# Patient Record
Sex: FEMALE | Race: WHITE | URBAN - METROPOLITAN AREA
[De-identification: names, ages, dates, MRNs, and addresses within clinical notes are randomized per-mention and may not be internally consistent; named-entity substitution may affect disease eponyms.]

---

## 2023-03-21 ENCOUNTER — NURSING HOME VISIT (OUTPATIENT)
Dept: POST ACUTE CARE | Facility: EXTERNAL LOCATION | Age: 88
End: 2023-03-21
Payer: MEDICARE

## 2023-03-21 DIAGNOSIS — F41.9 ANXIETY: ICD-10-CM

## 2023-03-21 DIAGNOSIS — I10 HYPERTENSION, UNSPECIFIED TYPE: ICD-10-CM

## 2023-03-21 DIAGNOSIS — K21.9 GASTROESOPHAGEAL REFLUX DISEASE, UNSPECIFIED WHETHER ESOPHAGITIS PRESENT: Primary | ICD-10-CM

## 2023-03-21 DIAGNOSIS — R00.0 TACHYCARDIA: ICD-10-CM

## 2023-03-21 PROCEDURE — 99348 HOME/RES VST EST LOW MDM 30: CPT | Performed by: EMERGENCY MEDICINE

## 2023-03-21 NOTE — LETTER
Patient: Layal Loco  : 1931    Encounter Date: 2023    Provider Impression     Assisted living note     Place of service- [facility] assisted living facility in [facility]     GERD- continue PPI  Hypertension- continue amlodipine and doxazosin  Tachycardia- continue metoprolol  Anxiety and depression-continue Lexapro     Provide safe environment for the patient     Continue current medication regimen     OT PT and speech therapy     Bowel and bladder,skin care     Nutritional support      monitor and treat blood glucose     GI and DVT prophylaxis     PRN medications     Periodic lab work     Regular Follow up      Charting was completed using electronic voice recognition technology and may have unintended errors which may not have been completely corrected.        History of Present Illness  Assisted living note     Place of service- [facility] assisted living facility in [facility] [place]        Patient is unable to give any detailed history and therefore history is obtained from the chart     No acute issues reported by patient or concerns raised by nursing     Past history  Anxiety and depression, hypertension, vascular dementia, GERD, coronary artery disease, DJD, CKD 3, cataracts, chronic back pain        Allergies-latex rubber     Social history-, negative for alcohol tobacco or drugs     Family history- noncontributory      Review of Systems  All eleven systems were reviewed with the patient and were negative for any symptoms other than what is documented in the history of present illness.        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 Lungs are clear   Cardiovascular: heart rate normal. No gallop  Back - normal inspection  Skin  inspection:Warm  Musculoskeletal : No deformities  Neuro : Limited exam. Baseline  Psychiatric : Cooperative      Electronically Signed By: Keegan Torrez MD   4/9/23  1:23 PM

## 2023-04-09 NOTE — PROGRESS NOTES
Provider Impression     Assisted living note     Place of service- [facility] assisted living facility in [facility]     GERD- continue PPI  Hypertension- continue amlodipine and doxazosin  Tachycardia- continue metoprolol  Anxiety and depression-continue Lexapro     Provide safe environment for the patient     Continue current medication regimen     OT PT and speech therapy     Bowel and bladder,skin care     Nutritional support      monitor and treat blood glucose     GI and DVT prophylaxis     PRN medications     Periodic lab work     Regular Follow up      Charting was completed using electronic voice recognition technology and may have unintended errors which may not have been completely corrected.        History of Present Illness  Assisted living note     Place of service- [facility] assisted living facility in [facility]        Patient is unable to give any detailed history and therefore history is obtained from the chart     No acute issues reported by patient or concerns raised by nursing     Past history  Anxiety and depression, hypertension, vascular dementia, GERD, coronary artery disease, DJD, CKD 3, cataracts, chronic back pain        Allergies-latex rubber     Social history-, negative for alcohol tobacco or drugs     Family history- noncontributory      Review of Systems  All eleven systems were reviewed with the patient and were negative for any symptoms other than what is documented in the history of present illness.        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 Lungs are clear   Cardiovascular: heart rate normal. No gallop  Back - normal inspection  Skin inspection:Warm  Musculoskeletal : No deformities  Neuro : Limited exam.  Baseline  Psychiatric : Cooperative

## 2023-04-18 ENCOUNTER — NURSING HOME VISIT (OUTPATIENT)
Dept: POST ACUTE CARE | Facility: EXTERNAL LOCATION | Age: 88
End: 2023-04-18
Payer: MEDICARE

## 2023-04-18 DIAGNOSIS — N18.30 STAGE 3 CHRONIC KIDNEY DISEASE, UNSPECIFIED WHETHER STAGE 3A OR 3B CKD (MULTI): ICD-10-CM

## 2023-04-18 DIAGNOSIS — F32.4 MAJOR DEPRESSIVE DISORDER IN PARTIAL REMISSION, UNSPECIFIED WHETHER RECURRENT (CMS-HCC): ICD-10-CM

## 2023-04-18 DIAGNOSIS — I11.9 CARDIOMYOPATHY, HYPERTENSIVE, BENIGN, WITHOUT HEART FAILURE (MULTI): ICD-10-CM

## 2023-04-18 DIAGNOSIS — F03.B0 MODERATE DEMENTIA, UNSPECIFIED DEMENTIA TYPE, UNSPECIFIED WHETHER BEHAVIORAL, PSYCHOTIC, OR MOOD DISTURBANCE OR ANXIETY (MULTI): Primary | ICD-10-CM

## 2023-04-18 DIAGNOSIS — I25.118 CORONARY ARTERY DISEASE INVOLVING NATIVE HEART WITH OTHER FORM OF ANGINA PECTORIS, UNSPECIFIED VESSEL OR LESION TYPE (CMS-HCC): ICD-10-CM

## 2023-04-18 DIAGNOSIS — I43 CARDIOMYOPATHY, HYPERTENSIVE, BENIGN, WITHOUT HEART FAILURE (MULTI): ICD-10-CM

## 2023-04-18 PROCEDURE — 99349 HOME/RES VST EST MOD MDM 40: CPT | Performed by: EMERGENCY MEDICINE

## 2023-04-18 NOTE — LETTER
Patient: Layla Loco  : 1931    Encounter Date: 2023    Provider Impression     Assisted living note     Place of service- [facility] assisted living facility in [facility]     GERD- continue PPI  Hypertension- continue amlodipine and doxazosin  Tachycardia- continue metoprolol  Anxiety and depression-continue Lexapro     -Fall prevention    -Cognitive engagement     -Monitor and treat blood pressure     -Aggressive decubitus ulcer prevention.     -Bowel and bladder care     -Optimal nutrition and supplementation as needed     -GI  and DVT prophylaxis     -Symptom control     -Ambulation as tolerated     -Will follow       Charting was completed using electronic voice recognition technology and may have unintended errors which may not have been completely corrected.        History of Present Illness  Assisted living note     Place of service- [facility] assisted living facility in [facility]        Patient is unable to give any detailed history and therefore history is obtained from the chart     No acute issues reported by patient or concerns raised by nursing     Past history  Anxiety and depression, hypertension, vascular dementia, GERD, coronary artery disease, DJD, CKD 3, cataracts, chronic back pain        Allergies-latex rubber     Social history-, negative for alcohol tobacco or drugs     Family history- noncontributory      Review of Systems  All eleven systems were reviewed with the patient and were negative for any symptoms other than what is documented in the history of present illness.        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 Lungs are clear   Cardiovascular: heart rate normal. No gallop  Back - normal inspection  Skin  inspection:Warm  Musculoskeletal : No deformities  Neuro : Limited exam. Baseline  Psychiatric : Cooperative      Electronically Signed By: Keegan Torrez MD   4/23/23  7:41 AM

## 2023-04-23 PROBLEM — I43 CARDIOMYOPATHY, HYPERTENSIVE, BENIGN, WITHOUT HEART FAILURE (MULTI): Status: ACTIVE | Noted: 2023-04-23

## 2023-04-23 PROBLEM — I25.10 CORONARY ARTERY DISEASE INVOLVING NATIVE HEART: Status: ACTIVE | Noted: 2023-04-23

## 2023-04-23 PROBLEM — I11.9 CARDIOMYOPATHY, HYPERTENSIVE, BENIGN, WITHOUT HEART FAILURE (MULTI): Status: ACTIVE | Noted: 2023-04-23

## 2023-04-23 PROBLEM — F03.B0 MODERATE DEMENTIA (MULTI): Status: ACTIVE | Noted: 2023-04-23

## 2023-04-23 PROBLEM — F32.4 MAJOR DEPRESSIVE DISORDER IN PARTIAL REMISSION (CMS-HCC): Status: ACTIVE | Noted: 2023-04-23

## 2023-04-23 PROBLEM — N18.30 STAGE 3 CHRONIC KIDNEY DISEASE (MULTI): Status: ACTIVE | Noted: 2023-04-23

## 2023-04-23 NOTE — PROGRESS NOTES
Provider Impression     Assisted living note     Place of service- [facility] assisted living facility in [facility]     GERD- continue PPI  Hypertension- continue amlodipine and doxazosin  Tachycardia- continue metoprolol  Anxiety and depression-continue Lexapro     -Fall prevention    -Cognitive engagement     -Monitor and treat blood pressure     -Aggressive decubitus ulcer prevention.     -Bowel and bladder care     -Optimal nutrition and supplementation as needed     -GI  and DVT prophylaxis     -Symptom control     -Ambulation as tolerated     -Will follow       Charting was completed using electronic voice recognition technology and may have unintended errors which may not have been completely corrected.        History of Present Illness  Assisted living note     Place of service- [facility] living [facility] in [facility]        Patient is unable to give any detailed history and therefore history is obtained from the chart     No acute issues reported by patient or concerns raised by nursing     Past history  Anxiety and depression, hypertension, vascular dementia, GERD, coronary artery disease, DJD, CKD 3, cataracts, chronic back pain        Allergies-latex rubber     Social history-, negative for alcohol tobacco or drugs     Family history- noncontributory      Review of Systems  All eleven systems were reviewed with the patient and were negative for any symptoms other than what is documented in the history of present illness.        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 Lungs are clear   Cardiovascular: heart rate normal. No gallop  Back - normal inspection  Skin inspection:Warm  Musculoskeletal : No deformities  Neuro : Limited exam.  Baseline  Psychiatric : Cooperative

## 2023-05-16 ENCOUNTER — HOME VISIT (OUTPATIENT)
Dept: POST ACUTE CARE | Facility: EXTERNAL LOCATION | Age: 88
End: 2023-05-16
Payer: MEDICARE

## 2023-05-16 DIAGNOSIS — N18.30 STAGE 3 CHRONIC KIDNEY DISEASE, UNSPECIFIED WHETHER STAGE 3A OR 3B CKD (MULTI): ICD-10-CM

## 2023-05-16 DIAGNOSIS — F03.B0 MODERATE DEMENTIA, UNSPECIFIED DEMENTIA TYPE, UNSPECIFIED WHETHER BEHAVIORAL, PSYCHOTIC, OR MOOD DISTURBANCE OR ANXIETY (MULTI): ICD-10-CM

## 2023-05-16 DIAGNOSIS — I25.118 CORONARY ARTERY DISEASE INVOLVING NATIVE HEART WITH OTHER FORM OF ANGINA PECTORIS, UNSPECIFIED VESSEL OR LESION TYPE (CMS-HCC): Primary | ICD-10-CM

## 2023-05-16 DIAGNOSIS — F32.4 MAJOR DEPRESSIVE DISORDER IN PARTIAL REMISSION, UNSPECIFIED WHETHER RECURRENT (CMS-HCC): ICD-10-CM

## 2023-05-16 PROCEDURE — 99349 HOME/RES VST EST MOD MDM 40: CPT | Performed by: EMERGENCY MEDICINE

## 2023-05-16 NOTE — PROGRESS NOTES
Provider Impression     Assisted living note     Place of service- [facility] living [facility] in [facility]     GERD- continue PPI  Hypertension- continue amlodipine and doxazosin  Tachycardia- continue metoprolol  Anxiety and depression-continue Lexapro     1. medications are reviewed      2. Continue with rehabilitative, supportive, and or restorative care as ordered and as the patient tolerates     3. Laboratory evaluations will be monitored on an ongoing as needed basis     4. Medications have been cross-referenced with the patient's diagnoses list, and medications reductions have been considered and/or implemented.     5. Pharmacy recommendations are addressed on an ongoing as needed basis.     6. Controlled substances have been electronically scripted every 60 days for opiates and others of similar schedule, and every 6 months for sedative/hypnotics and others of similar schedule.     7. Nursing has been queried about any potential adverse events that need to be reported to me.    Salient information and adjustment of care plan pertaining to this individual patient interaction today are the following:      A. We will continue with restorative and supportive care as the patient tolerates    B. Laboratory examinations will continue to be drawn on an ongoing as-needed basis. The patient's weight needs to be monitored and if needed we may need to institute appetite stimulating medication    C. The patient's long term prognosis is guarded    Charting is done using voice recognition software and may contain errors which may not have been completely corrected          History of Present Illness  Assisted living note     Place of service- [facility] living [facility] in [facility]        Patient is unable to give any detailed history and therefore history is obtained from the chart     No acute issues reported by patient or concerns raised by nursing     Past history  Anxiety and depression,  hypertension, vascular dementia, GERD, coronary artery disease, DJD, CKD 3, cataracts, chronic back pain        Allergies-latex rubber     Social history-, negative for alcohol tobacco or drugs     Family history- noncontributory      Review of Systems  All eleven systems were reviewed with the patient and were negative for any symptoms other than what is documented in the history of present illness.        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 Lungs are clear   Cardiovascular: heart rate normal. No gallop  Back - normal inspection  Skin inspection:Warm  Musculoskeletal : No deformities  Neuro : Limited exam. Baseline  Psychiatric : Cooperative

## 2023-06-20 ENCOUNTER — HOME VISIT (OUTPATIENT)
Dept: POST ACUTE CARE | Facility: EXTERNAL LOCATION | Age: 88
End: 2023-06-20
Payer: MEDICARE

## 2023-06-20 DIAGNOSIS — I25.118 CORONARY ARTERY DISEASE INVOLVING NATIVE HEART WITH OTHER FORM OF ANGINA PECTORIS, UNSPECIFIED VESSEL OR LESION TYPE (CMS-HCC): ICD-10-CM

## 2023-06-20 DIAGNOSIS — N18.30 STAGE 3 CHRONIC KIDNEY DISEASE, UNSPECIFIED WHETHER STAGE 3A OR 3B CKD (MULTI): ICD-10-CM

## 2023-06-20 DIAGNOSIS — F32.4 MAJOR DEPRESSIVE DISORDER IN PARTIAL REMISSION, UNSPECIFIED WHETHER RECURRENT (CMS-HCC): Primary | ICD-10-CM

## 2023-06-20 DIAGNOSIS — F03.B0 MODERATE DEMENTIA, UNSPECIFIED DEMENTIA TYPE, UNSPECIFIED WHETHER BEHAVIORAL, PSYCHOTIC, OR MOOD DISTURBANCE OR ANXIETY (MULTI): ICD-10-CM

## 2023-06-20 PROCEDURE — 99348 HOME/RES VST EST LOW MDM 30: CPT | Performed by: EMERGENCY MEDICINE

## 2023-07-01 NOTE — PROGRESS NOTES
Provider Impression     Assisted living note     Place of service- [facility] assisted living facility in [facility]     GERD- continue PPI  Hypertension- continue amlodipine and doxazosin  Tachycardia- continue metoprolol  Anxiety and depression-continue Lexapro     -Fall prevention    -Cognitive engagement     -Monitor and treat blood pressure     -Aggressive decubitus ulcer prevention.     -Bowel and bladder care     -Optimal nutrition and supplementation as needed     -GI  and DVT prophylaxis     -Symptom control     -Ambulation as tolerated     -Will follow    Charting is done using voice recognition software and may contain errors which have not been completely corrected            History of Present Illness  Assisted living note     Place of service- [facility] assisted living [facility] in [facility]        Patient is unable to give any detailed history and therefore history is obtained from the chart     No acute issues reported by patient or concerns raised by nursing     Past history  Anxiety and depression, hypertension, vascular dementia, GERD, coronary artery disease, DJD, CKD 3, cataracts, chronic back pain        Allergies-latex rubber     Social history-, negative for alcohol tobacco or drugs     Family history- noncontributory      Review of Systems  All eleven systems were reviewed with the patient and were negative for any symptoms other than what is documented in the history of present illness.        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 Lungs are clear   Cardiovascular: heart rate normal. No gallop  Back - normal inspection  Skin inspection:Warm  Musculoskeletal : No deformities  Neuro : Limited exam. Baseline  Psychiatric :  Cooperative

## 2023-07-18 ENCOUNTER — HOME VISIT (OUTPATIENT)
Dept: POST ACUTE CARE | Facility: EXTERNAL LOCATION | Age: 88
End: 2023-07-18
Payer: MEDICARE

## 2023-07-18 DIAGNOSIS — I43 CARDIOMYOPATHY, HYPERTENSIVE, BENIGN, WITHOUT HEART FAILURE (MULTI): ICD-10-CM

## 2023-07-18 DIAGNOSIS — F32.4 MAJOR DEPRESSIVE DISORDER IN PARTIAL REMISSION, UNSPECIFIED WHETHER RECURRENT (CMS-HCC): ICD-10-CM

## 2023-07-18 DIAGNOSIS — I11.9 CARDIOMYOPATHY, HYPERTENSIVE, BENIGN, WITHOUT HEART FAILURE (MULTI): ICD-10-CM

## 2023-07-18 DIAGNOSIS — N18.30 STAGE 3 CHRONIC KIDNEY DISEASE, UNSPECIFIED WHETHER STAGE 3A OR 3B CKD (MULTI): ICD-10-CM

## 2023-07-18 DIAGNOSIS — I25.118 CORONARY ARTERY DISEASE INVOLVING NATIVE HEART WITH OTHER FORM OF ANGINA PECTORIS, UNSPECIFIED VESSEL OR LESION TYPE (CMS-HCC): ICD-10-CM

## 2023-07-18 DIAGNOSIS — F03.B0 MODERATE DEMENTIA, UNSPECIFIED DEMENTIA TYPE, UNSPECIFIED WHETHER BEHAVIORAL, PSYCHOTIC, OR MOOD DISTURBANCE OR ANXIETY (MULTI): ICD-10-CM

## 2023-07-18 DIAGNOSIS — Z00.00 ENCOUNTER FOR MEDICARE ANNUAL WELLNESS EXAM: Primary | ICD-10-CM

## 2023-07-18 PROCEDURE — 99348 HOME/RES VST EST LOW MDM 30: CPT | Performed by: EMERGENCY MEDICINE

## 2023-07-18 PROCEDURE — G0439 PPPS, SUBSEQ VISIT: HCPCS | Performed by: EMERGENCY MEDICINE

## 2023-07-18 PROCEDURE — 99497 ADVNCD CARE PLAN 30 MIN: CPT | Performed by: EMERGENCY MEDICINE

## 2023-08-02 ASSESSMENT — ACTIVITIES OF DAILY LIVING (ADL)
DRESSING: DEPENDENT
MANAGING_FINANCES: TOTAL CARE
DOING_HOUSEWORK: TOTAL CARE
TAKING_MEDICATION: TOTAL CARE
BATHING: DEPENDENT
GROCERY_SHOPPING: TOTAL CARE

## 2023-08-02 ASSESSMENT — PATIENT HEALTH QUESTIONNAIRE - PHQ9
2. FEELING DOWN, DEPRESSED OR HOPELESS: SEVERAL DAYS
10. IF YOU CHECKED OFF ANY PROBLEMS, HOW DIFFICULT HAVE THESE PROBLEMS MADE IT FOR YOU TO DO YOUR WORK, TAKE CARE OF THINGS AT HOME, OR GET ALONG WITH OTHER PEOPLE: VERY DIFFICULT
1. LITTLE INTEREST OR PLEASURE IN DOING THINGS: SEVERAL DAYS
SUM OF ALL RESPONSES TO PHQ9 QUESTIONS 1 AND 2: 2

## 2023-08-02 NOTE — PROGRESS NOTES
Provider Impression     MEDICARE [facility]LLNESS      Assisted living note     Place of service- [facility] assisted living facility in [facility]     GERD- continue PPI  Hypertension- continue amlodipine and doxazosin  Tachycardia- continue metoprolol  Anxiety and depression-continue Lexapro     Provide safe environment for the patient     Continue current medication regimen     OT PT and speech therapy     Bowel and bladder,skin care     Nutritional support     monitor and treat blood glucose     GI  and DVT prophylaxis     PRN medications     Periodic lab work    Regular Follow up    PH Q-9 depression screening was completed by authorized employee of the practice and answer of the questionnaire were explained and discussed with the patient.    Face-to-face with discussion completed with this individual regarding their cardiovascular risk and behavioral therapies of nutritional choices, exercise and elimination of habits contradicting to the risk. We agreed on how they may be able to reduce their current cardiovascular risk.     Screening for alcohol use completed.     I discussed advanced care planning including the explanation and discussion of advanced directives. If patient does not have current up to date documents, examples and information provided on how to create both living will and power of . Patient was encouraged to work on completing these documents.  Information and advise was also provided on DO NOT RESUSCITATE and patient encouraged to consider this  Patient code status is filed with facility.     Charting is done using voice recognition software and may contain errors which have not been completely corrected         History of Present Illness  Assisted living note    [facility]- [facility] assisted living [facility] in [facility]        Patient is unable to give any detailed history and therefore history is obtained from the chart     No acute issues reported  by patient or concerns raised by nursing     Past history  Anxiety and depression, hypertension, vascular dementia, GERD, coronary artery disease, DJD, CKD 3, cataracts, chronic back pain        Allergies-latex rubber     Social history-, negative for alcohol tobacco or drugs     Family history- noncontributory      Review of Systems  All eleven systems were reviewed with the patient and were negative for any symptoms other than what is documented in the history of present illness.        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 Lungs are clear   Cardiovascular: heart rate normal. No gallop  Back - normal inspection  Skin inspection:Warm  Musculoskeletal : No deformities  Neuro : Limited exam. Baseline  Psychiatric : Cooperative

## 2023-08-17 ENCOUNTER — HOME VISIT (OUTPATIENT)
Dept: POST ACUTE CARE | Facility: EXTERNAL LOCATION | Age: 88
End: 2023-08-17
Payer: MEDICARE

## 2023-08-17 DIAGNOSIS — F03.B0 MODERATE DEMENTIA, UNSPECIFIED DEMENTIA TYPE, UNSPECIFIED WHETHER BEHAVIORAL, PSYCHOTIC, OR MOOD DISTURBANCE OR ANXIETY (MULTI): ICD-10-CM

## 2023-08-17 DIAGNOSIS — I43 CARDIOMYOPATHY, HYPERTENSIVE, BENIGN, WITHOUT HEART FAILURE (MULTI): ICD-10-CM

## 2023-08-17 DIAGNOSIS — I11.9 CARDIOMYOPATHY, HYPERTENSIVE, BENIGN, WITHOUT HEART FAILURE (MULTI): ICD-10-CM

## 2023-08-17 DIAGNOSIS — N18.30 STAGE 3 CHRONIC KIDNEY DISEASE, UNSPECIFIED WHETHER STAGE 3A OR 3B CKD (MULTI): Primary | ICD-10-CM

## 2023-08-17 DIAGNOSIS — F32.4 MAJOR DEPRESSIVE DISORDER IN PARTIAL REMISSION, UNSPECIFIED WHETHER RECURRENT (CMS-HCC): ICD-10-CM

## 2023-08-17 PROCEDURE — 99348 HOME/RES VST EST LOW MDM 30: CPT | Performed by: EMERGENCY MEDICINE

## 2023-08-26 NOTE — PROGRESS NOTES
Provider Impression     Assisted living note     Place of service- [facility] assisted living facility in [facility]     GERD- continue PPI  Hypertension- continue amlodipine and doxazosin  Tachycardia- continue metoprolol  Anxiety and depression-continue Lexapro     Provide safe environment for the patient     Continue current medication regimen     OT PT and speech therapy     Bowel and bladder,skin care     Nutritional support     monitor and treat blood glucose     GI  and DVT prophylaxis     PRN medications     Periodic lab work    Regular Follow up    Charting is done using voice recognition software and may contain errors which have not been completely corrected              History of Present Illness  Assisted living note     Place of service- [facility] assisted living facility in [facility]        Patient is unable to give any detailed history and therefore history is obtained from the chart     No acute issues reported by patient or concerns raised by nursing     Past history  Anxiety and depression, hypertension, vascular dementia, GERD, coronary artery disease, DJD, CKD 3, cataracts, chronic back pain        Allergies-latex rubber     Social history-, negative for alcohol tobacco or drugs     Family history- noncontributory      Review of Systems  All eleven systems were reviewed with the patient and were negative for any symptoms other than what is documented in the history of present illness.        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 Lungs are clear   Cardiovascular: heart rate normal. No gallop  Back - normal inspection  Skin inspection:Warm  Musculoskeletal : No deformities  Neuro : Limited exam. Baseline  Psychiatric : Cooperative

## 2023-09-26 ENCOUNTER — NURSING HOME VISIT (OUTPATIENT)
Dept: POST ACUTE CARE | Facility: EXTERNAL LOCATION | Age: 88
End: 2023-09-26
Payer: MEDICARE

## 2023-09-26 DIAGNOSIS — I11.9 CARDIOMYOPATHY, HYPERTENSIVE, BENIGN, WITHOUT HEART FAILURE (MULTI): ICD-10-CM

## 2023-09-26 DIAGNOSIS — F32.4 MAJOR DEPRESSIVE DISORDER IN PARTIAL REMISSION, UNSPECIFIED WHETHER RECURRENT (CMS-HCC): ICD-10-CM

## 2023-09-26 DIAGNOSIS — N18.30 STAGE 3 CHRONIC KIDNEY DISEASE, UNSPECIFIED WHETHER STAGE 3A OR 3B CKD (MULTI): ICD-10-CM

## 2023-09-26 DIAGNOSIS — I43 CARDIOMYOPATHY, HYPERTENSIVE, BENIGN, WITHOUT HEART FAILURE (MULTI): ICD-10-CM

## 2023-09-26 PROCEDURE — 99349 HOME/RES VST EST MOD MDM 40: CPT | Performed by: EMERGENCY MEDICINE

## 2023-09-26 NOTE — LETTER
Patient: Layla Loco  : 1931    Encounter Date: 2023    Provider Impression     Assisted living note     Place of service- [facility] assisted living facility in [facility] [place]     GERD- continue PPI  Hypertension- continue amlodipine and doxazosin  Tachycardia- continue metoprolol  Anxiety and depression-continue Lexapro     Rx list reviewed.   PT and OT evaluation is in the process.   Routine safety measures, fall precautions, risk modification and alarm placement if needed for prevention of falls.   Skin care precautions, prevention of pressures sores at pressure points assessed.   Pt needs to be monitored frequently by nursing staff particularly at night time.   Any confusion, agitation or behavioural disturbance needs to be attended, as per home policy   rapid covid Ag assay need to be done, notify if positive.   If needed appropriate measures to be taken for alarm placements and assisted devices, pt was told not to get up and ambulate at night unless help and assist available at bedside,   labs will be done as per our routine protocol.   PO intake need to be monitored if consuming po.       Charting is done using voice recognition software and may contain errors which have not been completely corrected      History of Present Illness  Assisted living note     Place of service- [facility] assisted living facility in [facility] [place]        Patient is unable to give any detailed history and therefore history is obtained from the chart     No acute issues reported by patient or concerns raised by nursing     Past history  Anxiety and depression, hypertension, vascular dementia, GERD, coronary artery disease, DJD, CKD 3, cataracts, chronic back pain        Allergies-latex rubber     Social history-, negative for alcohol tobacco or drugs     Family history- noncontributory      Review of Systems  All eleven systems were reviewed with the patient and were negative for any symptoms other than  what is documented in the history of present illness.        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 Lungs are clear   Cardiovascular: heart rate normal. No gallop  Back - normal inspection  Skin inspection:Warm  Musculoskeletal : No deformities  Neuro : Limited exam. Baseline  Psychiatric : Cooperative      Electronically Signed By: Keegan Torrez MD   10/10/23  1:32 PM

## 2023-09-29 NOTE — PROGRESS NOTES
Provider Impression     Assisted living note     Place of service- [facility] assisted living facility in [facility]     GERD- continue PPI  Hypertension- continue amlodipine and doxazosin  Tachycardia- continue metoprolol  Anxiety and depression-continue Lexapro     Rx list reviewed.   PT and OT evaluation is in the process.   Routine safety measures, fall precautions, risk modification and alarm placement if needed for prevention of falls.   Skin care precautions, prevention of pressures sores at pressure points assessed.   Pt needs to be monitored frequently by nursing staff particularly at night time.   Any confusion, agitation or behavioural disturbance needs to be attended, as per home policy   rapid covid Ag assay need to be done, notify if positive.   If needed appropriate measures to be taken for alarm placements and assisted devices, pt was told not to get up and ambulate at night unless help and assist available at bedside,   labs will be done as per our routine protocol.   PO intake need to be monitored if consuming po.       Charting is done using voice recognition software and may contain errors which have not been completely corrected      History of Present Illness  Assisted living note     Place of service- [facility] assisted living [facility] in [facility]        Patient is unable to give any detailed history and therefore history is obtained from the chart     No acute issues reported by patient or concerns raised by nursing     Past history  Anxiety and depression, hypertension, vascular dementia, GERD, coronary artery disease, DJD, CKD 3, cataracts, chronic back pain        Allergies-latex rubber     Social history-, negative for alcohol tobacco or drugs     Family history- noncontributory      Review of Systems  All eleven systems were reviewed with the patient and were negative for any symptoms other than what is documented in the history of present illness.        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 Lungs are clear   Cardiovascular: heart rate normal. No gallop  Back - normal inspection  Skin inspection:Warm  Musculoskeletal : No deformities  Neuro : Limited exam. Baseline  Psychiatric : Cooperative

## 2023-10-26 ENCOUNTER — HOME VISIT (OUTPATIENT)
Dept: POST ACUTE CARE | Facility: EXTERNAL LOCATION | Age: 88
End: 2023-10-26
Payer: MEDICARE

## 2023-10-26 DIAGNOSIS — I25.118 CORONARY ARTERY DISEASE INVOLVING NATIVE HEART WITH OTHER FORM OF ANGINA PECTORIS, UNSPECIFIED VESSEL OR LESION TYPE (CMS-HCC): ICD-10-CM

## 2023-10-26 DIAGNOSIS — I43 CARDIOMYOPATHY, HYPERTENSIVE, BENIGN, WITHOUT HEART FAILURE (MULTI): ICD-10-CM

## 2023-10-26 DIAGNOSIS — I11.9 CARDIOMYOPATHY, HYPERTENSIVE, BENIGN, WITHOUT HEART FAILURE (MULTI): ICD-10-CM

## 2023-10-26 DIAGNOSIS — N18.30 STAGE 3 CHRONIC KIDNEY DISEASE, UNSPECIFIED WHETHER STAGE 3A OR 3B CKD (MULTI): Primary | ICD-10-CM

## 2023-10-26 DIAGNOSIS — F03.B0 MODERATE DEMENTIA, UNSPECIFIED DEMENTIA TYPE, UNSPECIFIED WHETHER BEHAVIORAL, PSYCHOTIC, OR MOOD DISTURBANCE OR ANXIETY (MULTI): ICD-10-CM

## 2023-10-26 DIAGNOSIS — F32.4 MAJOR DEPRESSIVE DISORDER IN PARTIAL REMISSION, UNSPECIFIED WHETHER RECURRENT (CMS-HCC): ICD-10-CM

## 2023-10-26 PROCEDURE — 99348 HOME/RES VST EST LOW MDM 30: CPT | Performed by: EMERGENCY MEDICINE

## 2023-10-31 NOTE — PROGRESS NOTES
Provider Impression     Assisted living note     Place of service- [facility] in [facility]     GERD- continue PPI  Hypertension- continue amlodipine and doxazosin  Tachycardia- continue metoprolol  Anxiety and depression-continue Lexapro     1. medications are reviewed      2. Continue with rehabilitative, supportive, and or restorative care as ordered and as the patient tolerates     3. Laboratory evaluations will be monitored on an ongoing as needed basis     4. Medications have been cross-referenced with the patient's diagnoses list, and medications reductions have been considered and/or implemented.     5. Pharmacy recommendations are addressed on an ongoing as needed basis.     6. Controlled substances have been electronically scripted every 60 days for opiates and others of similar schedule, and every 6 months for sedative/hypnotics and others of similar schedule.     7. Nursing has been queried about any potential adverse events that need to be reported to me.    Salient information and adjustment of care plan pertaining to this individual patient interaction today are the following:      A. We will continue with restorative and supportive care as the patient tolerates    B. Laboratory examinations will continue to be drawn on an ongoing as-needed basis. The patient's weight needs to be monitored and if needed we may need to institute appetite stimulating medication    C. The patient's long term prognosis is guarded    Charting is done using voice recognition software and may contain errors which may not have been completely corrected        History of Present Illness  Assisted living note     Place of service- [facility]        Patient is unable to give any detailed history and therefore history is obtained from the chart     No acute issues reported by patient or concerns raised by nursing     Past history  Anxiety and depression,  hypertension, vascular dementia, GERD, coronary artery disease, DJD, CKD 3, cataracts, chronic back pain        Allergies-latex rubber     Social history-, negative for alcohol tobacco or drugs     Family history- noncontributory      Review of Systems  All eleven systems were reviewed with the patient and were negative for any symptoms other than what is documented in the history of present illness.        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 Lungs are clear   Cardiovascular: heart rate normal. No gallop  Back - normal inspection  Skin inspection:Warm  Musculoskeletal : No deformities  Neuro : Limited exam. Baseline  Psychiatric : Cooperative

## 2023-11-28 ENCOUNTER — HOME VISIT (OUTPATIENT)
Dept: POST ACUTE CARE | Facility: EXTERNAL LOCATION | Age: 88
End: 2023-11-28
Payer: MEDICARE

## 2023-11-28 DIAGNOSIS — N18.30 STAGE 3 CHRONIC KIDNEY DISEASE, UNSPECIFIED WHETHER STAGE 3A OR 3B CKD (MULTI): ICD-10-CM

## 2023-11-28 DIAGNOSIS — I43 CARDIOMYOPATHY, HYPERTENSIVE, BENIGN, WITHOUT HEART FAILURE (MULTI): ICD-10-CM

## 2023-11-28 DIAGNOSIS — F32.4 MAJOR DEPRESSIVE DISORDER IN PARTIAL REMISSION, UNSPECIFIED WHETHER RECURRENT (CMS-HCC): Primary | ICD-10-CM

## 2023-11-28 DIAGNOSIS — I11.9 CARDIOMYOPATHY, HYPERTENSIVE, BENIGN, WITHOUT HEART FAILURE (MULTI): ICD-10-CM

## 2023-11-28 DIAGNOSIS — I25.118 CORONARY ARTERY DISEASE INVOLVING NATIVE HEART WITH OTHER FORM OF ANGINA PECTORIS, UNSPECIFIED VESSEL OR LESION TYPE (CMS-HCC): ICD-10-CM

## 2023-11-28 PROCEDURE — 99349 HOME/RES VST EST MOD MDM 40: CPT | Performed by: EMERGENCY MEDICINE

## 2023-12-03 NOTE — PROGRESS NOTES
Provider Impression     Assisted living note     Place of service- [facility] assisted living facility in [facility]     GERD- continue PPI  Hypertension- continue amlodipine and doxazosin  Tachycardia- continue metoprolol  Anxiety and depression-continue Lexapro     ESSENTIAL (PRIMARY) HYPERTENSION 3/14/2023 Secondary Diagnosis 3/14/2023 [facility]    G47.00  INSOMNIA, UNSPECIFIED 3/14/2023 Secondary Diagnosis 3/14/2023 [facility]    D64.9  ANEMIA, UNSPECIFIED 12/12/2019 Primary Diagnosis 12/12/2019 [facility]    F41.9  ANXIETY DISORDER, UNSPECIFIED 12/12/2019 Primary Diagnosis 12/12/2019 [facility]  [facility]    N39.0  URINARY TRACT INFECTION, SITE NOT SPECIFIED 8/28/2023 8/28/2023 orders-service.[facility]  view    F03.90  UNSPECIFIED DEMENTIA, UNSPECIFIED SEVERITY, WITHOUT BEHAVIORAL DISTURBANCE, PSYCHOTIC DISTURBANCE, MOOD DISTURBANCE, AND ANXIETY 10/22/2022  10/22/2022 [facility]     DOXAZOSIN MESYLATE 4 MG TAB   TAKE (1) TABLET BY MOUTH DAILY.(Indications for Use: HTN)(Indications for use: HTN)  Pharmacy Active 5/23/2022 08:00  5/18/2022  There is a potential drug interaction with another medication. Please click to view details. Dose Warning SODIUM BICARB 650 MG TABLET   TAKE (1) TABLET BY MOUTH DAILY.(Indications for Use: HYPONATREMIA)(Indications for use: HYPONATREMIA)  Pharmacy Active 5/23/2022 08:00  5/18/2022  There is a potential drug interaction with another medication. Please click to view details. CYCLOBENZAPRINE 5 MG TABLET   TAKE (1) TABLET BY MOUTH AT BEDTIME.(Related Diagnoses: PAIN, UNSPECIFIED (R52))(Indications for Use: PAIN)(Indications for use: PAIN)  Pharmacy Active 5/23/2022 20:00  5/18/2022  PANTOPRAZOLE SOD DR 40 MG T   TAKE (1) TABLET BY MOUTH DAILY BEFORE BREAKFAST.(Indications for Use: GERD)(Indications for use: GERD)  Pharmacy Active 5/23/2022 08:00  5/18/2022  There is a black box warning associated with this order. Please click to view details. There is a potential drug  interaction with another medication. Please click to view details. LOSARTAN POTASSIUM 50 MG TA   TAKE (1) TABLET BY MOUTH TWICE DAILY.(Indications for Use: HTN)(Indications for use: HTN)  Pharmacy Active 5/23/2022 08:00  5/18/2022  There is a black box warning associated with this order. Please click to view details. LOPERAMIDE 2 MG CAPSULE   TAKE 1 CAPSULE BY MOUTH ONCE DAILY AS NEEDED FOR DIARRHEA(Indications for use: DIARRHEA)  Pharmacy Active 5/23/2022 07:00  5/18/2022  ALBUTEROL SUL HFA 90 MCG IN   INHALE 2 PUFFS BY MOUTH EVERY 6 HOURS AS NEEDED(Indications for Use: WHEEZING/SOB)(Indications for use: WHEEZING/SOB)  Pharmacy Active 5/23/2022 07:00  5/18/2022  There is a potential drug interaction with another medication. Please click to view details. CLONIDINE HCL 0.3 MG TABLET   TAKE (1) TABLET BY MOUTH TWICE DAILY.(Indications for Use: HTN)(Indications for use: HTN)  Pharmacy Active 7/8/2022 08:00  7/5/2022  TORSEMIDE 20 MG TABLET   TAKE (2) TABLETS BY MOUTH DAILY.(Indications for Use: edema)(Indications for use: edema)  Pharmacy Active 8/23/2022 08:00  8/22/2022  There is a black box warning associated with this order. Please click to view details. There is a potential drug interaction with another medication. Please click to view details. ESCITALOPRAM 20 MG TABLET   TAKE (1) TABLET BY MOUTH AT BEDTIME.(Related Diagnoses: ANXIETY DISORDER, UNSPECIFIED (F41.9))  Pharmacy Active 11/8/2022 20:00  11/8/2022  ACETAMINOPHEN 325 MG TABLET   TAKE (2) TABLETS BY MOUTH EVERY (8) HOURS AS NEEDED(MAX OF 3000MG/APAP/24 HOURS FROM ALL SOURCES)(Indications for Use: Pain/Fever)  Pharmacy Active 12/2/2022 10:03  12/4/2022  Dose Check could not be performed. NYSTATIN POWDER   APPLY TOPICALLY TO AFFECTED AREAS OF GROIN 2 TIMES A DAY AS NEEDED(Indications for Use: skin intergity)  Pharmacy Active 2/15/2023 11:41  2/15/2023  There is a potential drug interaction with another medication. Please click to view details. Dose  Warning HYDRALAZINE 100 MG TABLET   Give 1 tablet orally every morning and at bedtime related to ESSENTIAL (PRIMARY) HYPERTENSION (I10)(Additional Directions: *HOLD IF SBP  OR LESS*) AND Give 1 tablet orally in the afternoon related to ESSENTIAL (PRIMARY) HYPERTENSION (I10)(Additional Directions: *HOLD IF SBP  OR LESS*)  Pharmacy Active 4/10/2023 20:00  4/22/2023  BIOTENE MOUTHWASH   RINSE MOUTH WITH 30 ML TWICE DAILY AS DIRECTED, SPIT OUT AFTER USE.(Indications for Use: DRY MOUTH)(Indications for use: DRY MOUTH)  Pharmacy Active 4/10/2023 20:00  4/10/2023  Warning: Controlled Drug Dose Check could not be performed. TRAMADOL HCL 50 MG TABLET (Tramadol HCl)   TAKE (1) TABLET BY MOUTH TWICE DAILY.(Indications for use: pain)(Additional Directions: PLEASE FAX DR)  Pharmacy Active 10/5/2023 16:22  11/15/2023  Dose Check could not be performed. METOPROLOL SUCC ER 50 MG TA (Metoprolol Succinate)   TAKE (1) TABLET BY MOUTH TWICE DAILY, HOLD FOR HR <60(Indications for use: HTN)  Pharmacy Active 10/6/2023 02:37  10/6/2023  Dose Check could not be performed. SULFAMETHOXAZOLE-TMP DS TAB (BACTRIM DS TABLET) (Sulfamethoxazole-Trimethoprim)   TAKE (1) TABLET BY MOUTH THREE TIMES WEEKLY(Indications for use: prevention)  Pharmacy Active 10/21/2023 14:48  10/21/2023  BUSPIRONE HCL 5 MG TABLET (BUSPAR 5 MG TABLET) (Buspirone HCl)   TAKE (1) TABLET BY MOUTH TWICE DAILY.  Pharmacy Active 11/22/2023 11:57  11/22/2023  MELATONIN 3 MG TABLET   TAKE (2) TABLETS BY MOUTH AT BEDTIME.(Indications for use: insomnia) AND  Pharmacy Active 11/29/2023 15:26  11/29/2023  HYDROXYZINE BETH 25 MG CAP (VISTARIL 25 MG CAPSULE) (Hydroxyzine Pamoate)   TAKE 1 CAPSULE BY MOUTH EVERY 8 HOURS AS NEEDED(Indications for use: ANXIETY & AGITATION )  Pharmacy Active 11/29/2023 15:27  11/29/2023  MIRTAZAPINE 15 MG TABLET (Mirtazapine)   TAKE (1) TABLET BY MOUTH AT BEDTIME.  Pharmacy Active 12/1/2023 20:00  12/1/2023    This patient was seen for my  regular monthly visit, nursing evaluations and nursing notes were reviewed, interim events are reviewed, interim concerns and messages were reviewed as we have communicated with nursing staff.  Any issues with the falls, skin care impairment, declining physical condition are reviewed and noted, diagnosis list were reviewed, list of medications were reviewed, living will related issues were reviewed, overall patient has been doing well, any declining in patient's condition or any change in patient's condition needs to be notified to physician promptly, discussed with nursing staff, if needed would communicate with family.  Patient stays confined here at the facility for long-term care, there are always concerns about long-term care related issues and concerns.  Nursing staff is trying their best to keep patient safe, all sort of measures has been taken to keep patient safe and comfortable.         History of Present Illness  Assisted living note     Place of service- [facility] assisted living facility in [facility]        Patient is unable to give any detailed history and therefore history is obtained from the chart     No acute issues reported by patient or concerns raised by nursing     Past history  Anxiety and depression, hypertension, vascular dementia, GERD, coronary artery disease, DJD, CKD 3, cataracts, chronic back pain        Allergies-latex rubber     Social history-, negative for alcohol tobacco or drugs     Family history- noncontributory      Review of Systems  All eleven systems were reviewed with the patient and were negative for any symptoms other than what is documented in the history of present illness.        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 Lungs are clear   Cardiovascular: heart rate normal. No gallop  Back - normal inspection  Skin inspection:Warm  Musculoskeletal : No deformities  Neuro : Limited exam. Baseline  Psychiatric : Cooperative

## 2023-12-21 ENCOUNTER — HOME VISIT (OUTPATIENT)
Dept: POST ACUTE CARE | Facility: EXTERNAL LOCATION | Age: 88
End: 2023-12-21
Payer: MEDICARE

## 2023-12-21 DIAGNOSIS — I25.118 CORONARY ARTERY DISEASE INVOLVING NATIVE HEART WITH OTHER FORM OF ANGINA PECTORIS, UNSPECIFIED VESSEL OR LESION TYPE (CMS-HCC): Primary | ICD-10-CM

## 2023-12-21 DIAGNOSIS — F32.4 MAJOR DEPRESSIVE DISORDER IN PARTIAL REMISSION, UNSPECIFIED WHETHER RECURRENT (CMS-HCC): ICD-10-CM

## 2023-12-21 DIAGNOSIS — N18.30 STAGE 3 CHRONIC KIDNEY DISEASE, UNSPECIFIED WHETHER STAGE 3A OR 3B CKD (MULTI): ICD-10-CM

## 2023-12-21 DIAGNOSIS — F03.B0 MODERATE DEMENTIA, UNSPECIFIED DEMENTIA TYPE, UNSPECIFIED WHETHER BEHAVIORAL, PSYCHOTIC, OR MOOD DISTURBANCE OR ANXIETY (MULTI): ICD-10-CM

## 2023-12-21 PROCEDURE — 99348 HOME/RES VST EST LOW MDM 30: CPT | Performed by: EMERGENCY MEDICINE

## 2023-12-25 NOTE — PROGRESS NOTES
Provider Impression     Assisted living note     Place of service- [facility] assisted living facility in [facility]     GERD- continue PPI  Hypertension- continue amlodipine and doxazosin  Tachycardia- continue metoprolol  Anxiety and depression-continue Lexapro     ESSENTIAL (PRIMARY) HYPERTENSION 3/14/2023 Secondary Diagnosis 3/14/2023 [facility]    G47.00  INSOMNIA, UNSPECIFIED 3/14/2023 Secondary Diagnosis 3/14/2023 [facility]  [facility]    D64.9  ANEMIA, UNSPECIFIED 12/12/2019 Primary Diagnosis 12/12/2019 [facility]  [facility]    F41.9  ANXIETY DISORDER, UNSPECIFIED 12/12/2019 Primary Diagnosis 12/12/2019 [facility]  [facility]    N39.0  URINARY TRACT INFECTION, SITE NOT SPECIFIED 8/28/2023 8/28/2023 orders-service.[facility]  view    F03.90  UNSPECIFIED DEMENTIA, UNSPECIFIED SEVERITY, WITHOUT BEHAVIORAL DISTURBANCE, PSYCHOTIC DISTURBANCE, MOOD DISTURBANCE, AND ANXIETY 10/22/2022  10/22/2022 [facility]     DOXAZOSIN MESYLATE 4 MG TAB   TAKE (1) TABLET BY MOUTH DAILY.(Indications for Use: HTN)(Indications for use: HTN)  Pharmacy Active 5/23/2022 08:00  5/18/2022  There is a potential drug interaction with another medication. Please click to view details. Dose Warning SODIUM BICARB 650 MG TABLET   TAKE (1) TABLET BY MOUTH DAILY.(Indications for Use: HYPONATREMIA)(Indications for use: HYPONATREMIA)  Pharmacy Active 5/23/2022 08:00  5/18/2022  There is a potential drug interaction with another medication. Please click to view details. CYCLOBENZAPRINE 5 MG TABLET   TAKE (1) TABLET BY MOUTH AT BEDTIME.(Related Diagnoses: PAIN, UNSPECIFIED (R52))(Indications for Use: PAIN)(Indications for use: PAIN)  Pharmacy Active 5/23/2022 20:00  5/18/2022  PANTOPRAZOLE SOD DR 40 MG T   TAKE (1) TABLET BY MOUTH DAILY BEFORE BREAKFAST.(Indications for Use: GERD)(Indications for use: GERD)  Pharmacy Active 5/23/2022 08:00  5/18/2022  There is a black box warning associated with this order. Please click to view details. There is a potential drug  interaction with another medication. Please click to view details. LOSARTAN POTASSIUM 50 MG TA   TAKE (1) TABLET BY MOUTH TWICE DAILY.(Indications for Use: HTN)(Indications for use: HTN)  Pharmacy Active 5/23/2022 08:00  5/18/2022  There is a black box warning associated with this order. Please click to view details. LOPERAMIDE 2 MG CAPSULE   TAKE 1 CAPSULE BY MOUTH ONCE DAILY AS NEEDED FOR DIARRHEA(Indications for use: DIARRHEA)  Pharmacy Active 5/23/2022 07:00  5/18/2022  ALBUTEROL SUL HFA 90 MCG IN   INHALE 2 PUFFS BY MOUTH EVERY 6 HOURS AS NEEDED(Indications for Use: WHEEZING/SOB)(Indications for use: WHEEZING/SOB)  Pharmacy Active 5/23/2022 07:00  5/18/2022  There is a potential drug interaction with another medication. Please click to view details. CLONIDINE HCL 0.3 MG TABLET   TAKE (1) TABLET BY MOUTH TWICE DAILY.(Indications for Use: HTN)(Indications for use: HTN)  Pharmacy Active 7/8/2022 08:00  7/5/2022  TORSEMIDE 20 MG TABLET   TAKE (2) TABLETS BY MOUTH DAILY.(Indications for Use: edema)(Indications for use: edema)  Pharmacy Active 8/23/2022 08:00  8/22/2022  There is a black box warning associated with this order. Please click to view details. There is a potential drug interaction with another medication. Please click to view details. ESCITALOPRAM 20 MG TABLET   TAKE (1) TABLET BY MOUTH AT BEDTIME.(Related Diagnoses: ANXIETY DISORDER, UNSPECIFIED (F41.9))  Pharmacy Active 11/8/2022 20:00  11/8/2022  ACETAMINOPHEN 325 MG TABLET   TAKE (2) TABLETS BY MOUTH EVERY (8) HOURS AS NEEDED(MAX OF 3000MG/APAP/24 HOURS FROM ALL SOURCES)(Indications for Use: Pain/Fever)  Pharmacy Active 12/2/2022 10:03  12/4/2022  Dose Check could not be performed. NYSTATIN POWDER   APPLY TOPICALLY TO AFFECTED AREAS OF GROIN 2 TIMES A DAY AS NEEDED(Indications for Use: skin intergity)  Pharmacy Active 2/15/2023 11:41  2/15/2023  There is a potential drug interaction with another medication. Please click to view details. Dose  Warning HYDRALAZINE 100 MG TABLET   Give 1 tablet orally every morning and at bedtime related to ESSENTIAL (PRIMARY) HYPERTENSION (I10)(Additional Directions: *HOLD IF SBP  OR LESS*) AND Give 1 tablet orally in the afternoon related to ESSENTIAL (PRIMARY) HYPERTENSION (I10)(Additional Directions: *HOLD IF SBP  OR LESS*)  Pharmacy Active 4/10/2023 20:00  4/22/2023  BIOTENE MOUTHWASH   RINSE MOUTH WITH 30 ML TWICE DAILY AS DIRECTED, SPIT OUT AFTER USE.(Indications for Use: DRY MOUTH)(Indications for use: DRY MOUTH)  Pharmacy Active 4/10/2023 20:00  4/10/2023  Warning: Controlled Drug Dose Check could not be performed. TRAMADOL HCL 50 MG TABLET (Tramadol HCl)   TAKE (1) TABLET BY MOUTH TWICE DAILY.(Indications for use: pain)(Additional Directions: PLEASE FAX DR)  Pharmacy Active 10/5/2023 16:22  11/15/2023  Dose Check could not be performed. METOPROLOL SUCC ER 50 MG TA (Metoprolol Succinate)   TAKE (1) TABLET BY MOUTH TWICE DAILY, HOLD FOR HR <60(Indications for use: HTN)  Pharmacy Active 10/6/2023 02:37  10/6/2023  Dose Check could not be performed. SULFAMETHOXAZOLE-TMP DS TAB (BACTRIM DS TABLET) (Sulfamethoxazole-Trimethoprim)   TAKE (1) TABLET BY MOUTH THREE TIMES WEEKLY(Indications for use: prevention)  Pharmacy Active 10/21/2023 14:48  10/21/2023  BUSPIRONE HCL 5 MG TABLET (BUSPAR 5 MG TABLET) (Buspirone HCl)   TAKE (1) TABLET BY MOUTH TWICE DAILY.  Pharmacy Active 11/22/2023 11:57  11/22/2023  MELATONIN 3 MG TABLET   TAKE (2) TABLETS BY MOUTH AT BEDTIME.(Indications for use: insomnia) AND  Pharmacy Active 11/29/2023 15:26  11/29/2023  HYDROXYZINE BETH 25 MG CAP (VISTARIL 25 MG CAPSULE) (Hydroxyzine Pamoate)   TAKE 1 CAPSULE BY MOUTH EVERY 8 HOURS AS NEEDED(Indications for use: ANXIETY & AGITATION )  Pharmacy Active 11/29/2023 15:27  11/29/2023  MIRTAZAPINE 15 MG TABLET (Mirtazapine)   TAKE (1) TABLET BY MOUTH AT BEDTIME.  Pharmacy Active 12/1/2023 20:00  12/1/2023    -Fall prevention    -Cognitive  engagement     -Monitor and treat blood pressure     -Aggressive decubitus ulcer prevention.     -Bowel and bladder care     -Optimal nutrition and supplementation as needed     -GI  and DVT prophylaxis     -Symptom control     -Ambulation as tolerated     -Will follow    Charting is done using voice recognition software and may contain errors which have not been completely corrected          History of Present Illness  Assisted living note     Place of service- [facility] living facility in [facility]        Patient is unable to give any detailed history and therefore history is obtained from the chart     No acute issues reported by patient or concerns raised by nursing     Past history  Anxiety and depression, hypertension, vascular dementia, GERD, coronary artery disease, DJD, CKD 3, cataracts, chronic back pain        Allergies-latex rubber     Social history-, negative for alcohol tobacco or drugs     Family history- noncontributory      Review of Systems  All eleven systems were reviewed with the patient and were negative for any symptoms other than what is documented in the history of present illness.        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 Lungs are clear   Cardiovascular: heart rate normal. No gallop  Back - normal inspection  Skin inspection:Warm  Musculoskeletal : No deformities  Neuro : Limited exam. Baseline  Psychiatric : Cooperative

## 2024-01-30 ENCOUNTER — HOME VISIT (OUTPATIENT)
Dept: POST ACUTE CARE | Facility: EXTERNAL LOCATION | Age: 89
End: 2024-01-30
Payer: MEDICARE

## 2024-01-30 DIAGNOSIS — F03.B0 MODERATE DEMENTIA, UNSPECIFIED DEMENTIA TYPE, UNSPECIFIED WHETHER BEHAVIORAL, PSYCHOTIC, OR MOOD DISTURBANCE OR ANXIETY (MULTI): ICD-10-CM

## 2024-01-30 DIAGNOSIS — I43 CARDIOMYOPATHY, HYPERTENSIVE, BENIGN, WITHOUT HEART FAILURE (MULTI): ICD-10-CM

## 2024-01-30 DIAGNOSIS — N18.30 STAGE 3 CHRONIC KIDNEY DISEASE, UNSPECIFIED WHETHER STAGE 3A OR 3B CKD (MULTI): ICD-10-CM

## 2024-01-30 DIAGNOSIS — I25.118 CORONARY ARTERY DISEASE INVOLVING NATIVE HEART WITH OTHER FORM OF ANGINA PECTORIS, UNSPECIFIED VESSEL OR LESION TYPE (CMS-HCC): Primary | ICD-10-CM

## 2024-01-30 DIAGNOSIS — I11.9 CARDIOMYOPATHY, HYPERTENSIVE, BENIGN, WITHOUT HEART FAILURE (MULTI): ICD-10-CM

## 2024-01-30 PROCEDURE — 99349 HOME/RES VST EST MOD MDM 40: CPT | Performed by: EMERGENCY MEDICINE

## 2024-02-01 NOTE — PROGRESS NOTES
Provider Impression     Assisted living note     Place of service- [facility] assisted living facility in [facility]     GERD- continue PPI  Hypertension- continue amlodipine and doxazosin  Tachycardia- continue metoprolol  Anxiety and depression-continue Lexapro     ESSENTIAL (PRIMARY) HYPERTENSION 3/14/2023 Secondary Diagnosis 3/14/2023 [facility]    G47.00  INSOMNIA, UNSPECIFIED 3/14/2023 Secondary Diagnosis 3/14/2023 [facility]    D64.9  ANEMIA, UNSPECIFIED 12/12/2019 Primary Diagnosis 12/12/2019 [facility]    F41.9  ANXIETY DISORDER, UNSPECIFIED 12/12/2019 Primary Diagnosis 12/12/2019 [facility]  [facility]    N39.0  URINARY TRACT INFECTION, SITE NOT SPECIFIED 8/28/2023 8/28/2023 orders-service.[facility]  view    F03.90  UNSPECIFIED DEMENTIA, UNSPECIFIED SEVERITY, WITHOUT BEHAVIORAL DISTURBANCE, PSYCHOTIC DISTURBANCE, MOOD DISTURBANCE, AND ANXIETY 10/22/2022  10/22/2022 [facility]     DOXAZOSIN MESYLATE 4 MG TAB   TAKE (1) TABLET BY MOUTH DAILY.(Indications for Use: HTN)(Indications for use: HTN)  Pharmacy Active 5/23/2022 08:00  5/18/2022  There is a potential drug interaction with another medication. Please click to view details. Dose Warning SODIUM BICARB 650 MG TABLET   TAKE (1) TABLET BY MOUTH DAILY.(Indications for Use: HYPONATREMIA)(Indications for use: HYPONATREMIA)  Pharmacy Active 5/23/2022 08:00  5/18/2022  There is a potential drug interaction with another medication. Please click to view details. CYCLOBENZAPRINE 5 MG TABLET   TAKE (1) TABLET BY MOUTH AT BEDTIME.(Related Diagnoses: PAIN, UNSPECIFIED (R52))(Indications for Use: PAIN)(Indications for use: PAIN)  Pharmacy Active 5/23/2022 20:00  5/18/2022  PANTOPRAZOLE SOD DR 40 MG T   TAKE (1) TABLET BY MOUTH DAILY BEFORE BREAKFAST.(Indications for Use: GERD)(Indications for use: GERD)  Pharmacy Active 5/23/2022 08:00  5/18/2022  There is a black box warning associated with this order. Please click to view details. There is a potential drug  interaction with another medication. Please click to view details. LOSARTAN POTASSIUM 50 MG TA   TAKE (1) TABLET BY MOUTH TWICE DAILY.(Indications for Use: HTN)(Indications for use: HTN)  Pharmacy Active 5/23/2022 08:00  5/18/2022  There is a black box warning associated with this order. Please click to view details. LOPERAMIDE 2 MG CAPSULE   TAKE 1 CAPSULE BY MOUTH ONCE DAILY AS NEEDED FOR DIARRHEA(Indications for use: DIARRHEA)  Pharmacy Active 5/23/2022 07:00  5/18/2022  ALBUTEROL SUL HFA 90 MCG IN   INHALE 2 PUFFS BY MOUTH EVERY 6 HOURS AS NEEDED(Indications for Use: WHEEZING/SOB)(Indications for use: WHEEZING/SOB)  Pharmacy Active 5/23/2022 07:00  5/18/2022  There is a potential drug interaction with another medication. Please click to view details. CLONIDINE HCL 0.3 MG TABLET   TAKE (1) TABLET BY MOUTH TWICE DAILY.(Indications for Use: HTN)(Indications for use: HTN)  Pharmacy Active 7/8/2022 08:00  7/5/2022  TORSEMIDE 20 MG TABLET   TAKE (2) TABLETS BY MOUTH DAILY.(Indications for Use: edema)(Indications for use: edema)  Pharmacy Active 8/23/2022 08:00  8/22/2022  There is a black box warning associated with this order. Please click to view details. There is a potential drug interaction with another medication. Please click to view details. ESCITALOPRAM 20 MG TABLET   TAKE (1) TABLET BY MOUTH AT BEDTIME.(Related Diagnoses: ANXIETY DISORDER, UNSPECIFIED (F41.9))  Pharmacy Active 11/8/2022 20:00  11/8/2022  ACETAMINOPHEN 325 MG TABLET   TAKE (2) TABLETS BY MOUTH EVERY (8) HOURS AS NEEDED(MAX OF 3000MG/APAP/24 HOURS FROM ALL SOURCES)(Indications for Use: Pain/Fever)  Pharmacy Active 12/2/2022 10:03  12/4/2022  Dose Check could not be performed. NYSTATIN POWDER   APPLY TOPICALLY TO AFFECTED AREAS OF GROIN 2 TIMES A DAY AS NEEDED(Indications for Use: skin intergity)  Pharmacy Active 2/15/2023 11:41  2/15/2023  There is a potential drug interaction with another medication. Please click to view details. Dose  Warning HYDRALAZINE 100 MG TABLET   Give 1 tablet orally every morning and at bedtime related to ESSENTIAL (PRIMARY) HYPERTENSION (I10)(Additional Directions: *HOLD IF SBP  OR LESS*) AND Give 1 tablet orally in the afternoon related to ESSENTIAL (PRIMARY) HYPERTENSION (I10)(Additional Directions: *HOLD IF SBP  OR LESS*)  Pharmacy Active 4/10/2023 20:00  4/22/2023  BIOTENE MOUTHWASH   RINSE MOUTH WITH 30 ML TWICE DAILY AS DIRECTED, SPIT OUT AFTER USE.(Indications for Use: DRY MOUTH)(Indications for use: DRY MOUTH)  Pharmacy Active 4/10/2023 20:00  4/10/2023  Warning: Controlled Drug Dose Check could not be performed. TRAMADOL HCL 50 MG TABLET (Tramadol HCl)   TAKE (1) TABLET BY MOUTH TWICE DAILY.(Indications for use: pain)(Additional Directions: PLEASE FAX DR)  Pharmacy Active 10/5/2023 16:22  11/15/2023  Dose Check could not be performed. METOPROLOL SUCC ER 50 MG TA (Metoprolol Succinate)   TAKE (1) TABLET BY MOUTH TWICE DAILY, HOLD FOR HR <60(Indications for use: HTN)  Pharmacy Active 10/6/2023 02:37  10/6/2023  Dose Check could not be performed. SULFAMETHOXAZOLE-TMP DS TAB (BACTRIM DS TABLET) (Sulfamethoxazole-Trimethoprim)   TAKE (1) TABLET BY MOUTH THREE TIMES WEEKLY(Indications for use: prevention)  Pharmacy Active 10/21/2023 14:48  10/21/2023  BUSPIRONE HCL 5 MG TABLET (BUSPAR 5 MG TABLET) (Buspirone HCl)   TAKE (1) TABLET BY MOUTH TWICE DAILY.  Pharmacy Active 11/22/2023 11:57  11/22/2023  MELATONIN 3 MG TABLET   TAKE (2) TABLETS BY MOUTH AT BEDTIME.(Indications for use: insomnia) AND  Pharmacy Active 11/29/2023 15:26  11/29/2023  HYDROXYZINE BETH 25 MG CAP (VISTARIL 25 MG CAPSULE) (Hydroxyzine Pamoate)   TAKE 1 CAPSULE BY MOUTH EVERY 8 HOURS AS NEEDED(Indications for use: ANXIETY & AGITATION )  Pharmacy Active 11/29/2023 15:27  11/29/2023  MIRTAZAPINE 15 MG TABLET (Mirtazapine)   TAKE (1) TABLET BY MOUTH AT BEDTIME.  Pharmacy Active 12/1/2023 20:00  12/1/2023    Provide safe environment for the  patient     Continue current medication regimen     OT PT and speech therapy     Bowel and bladder,skin care     Nutritional support     monitor and treat blood glucose     GI  and DVT prophylaxis     PRN medications     Periodic lab work    Regular Follow up    Charting is done using voice recognition software and may contain errors which have not been completely corrected        History of Present Illness  Assisted living note     Place of service- [facility] living facility in [facility]        Patient is unable to give any detailed history and therefore history is obtained from the chart     No acute issues reported by patient or concerns raised by nursing     Past history  Anxiety and depression, hypertension, vascular dementia, GERD, coronary artery disease, DJD, CKD 3, cataracts, chronic back pain        Allergies-latex rubber     Social history-, negative for alcohol tobacco or drugs     Family history- noncontributory      Review of Systems  All eleven systems were reviewed with the patient and were negative for any symptoms other than what is documented in the history of present illness.        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 Lungs are clear   Cardiovascular: heart rate normal. No gallop  Back - normal inspection  Skin inspection:Warm  Musculoskeletal : No deformities  Neuro : Limited exam. Baseline  Psychiatric : Cooperative

## 2024-02-22 ENCOUNTER — HOME VISIT (OUTPATIENT)
Dept: POST ACUTE CARE | Facility: EXTERNAL LOCATION | Age: 89
End: 2024-02-22
Payer: MEDICARE

## 2024-02-22 DIAGNOSIS — I43 CARDIOMYOPATHY, HYPERTENSIVE, BENIGN, WITHOUT HEART FAILURE (MULTI): Primary | ICD-10-CM

## 2024-02-22 DIAGNOSIS — F03.B0 MODERATE DEMENTIA, UNSPECIFIED DEMENTIA TYPE, UNSPECIFIED WHETHER BEHAVIORAL, PSYCHOTIC, OR MOOD DISTURBANCE OR ANXIETY (MULTI): ICD-10-CM

## 2024-02-22 DIAGNOSIS — I11.9 CARDIOMYOPATHY, HYPERTENSIVE, BENIGN, WITHOUT HEART FAILURE (MULTI): Primary | ICD-10-CM

## 2024-02-22 DIAGNOSIS — N18.30 STAGE 3 CHRONIC KIDNEY DISEASE, UNSPECIFIED WHETHER STAGE 3A OR 3B CKD (MULTI): ICD-10-CM

## 2024-02-22 DIAGNOSIS — F32.4 MAJOR DEPRESSIVE DISORDER IN PARTIAL REMISSION, UNSPECIFIED WHETHER RECURRENT (CMS-HCC): ICD-10-CM

## 2024-02-22 PROCEDURE — 99348 HOME/RES VST EST LOW MDM 30: CPT | Performed by: EMERGENCY MEDICINE

## 2024-02-28 NOTE — PROGRESS NOTES
Provider Impression     Assisted living note     Place of service- [facility] assisted living facility in [facility]     GERD- continue PPI  Hypertension- continue amlodipine and doxazosin  Tachycardia- continue metoprolol  Anxiety and depression-continue Lexapro     ESSENTIAL (PRIMARY) HYPERTENSION 3/14/2023 Secondary Diagnosis 3/14/2023 [facility]    G47.00  INSOMNIA, UNSPECIFIED 3/14/2023 Secondary Diagnosis 3/14/2023 [facility]  [facility]    D64.9  ANEMIA, UNSPECIFIED 12/12/2019 Primary Diagnosis 12/12/2019 [facility]  [facility]    F41.9  ANXIETY DISORDER, UNSPECIFIED 12/12/2019 Primary Diagnosis 12/12/2019 [facility]  [facility]    N39.0  URINARY TRACT INFECTION, SITE NOT SPECIFIED 8/28/2023 8/28/2023 orders-service.[facility]  view    F03.90  UNSPECIFIED DEMENTIA, UNSPECIFIED SEVERITY, WITHOUT BEHAVIORAL DISTURBANCE, PSYCHOTIC DISTURBANCE, MOOD DISTURBANCE, AND ANXIETY 10/22/2022  10/22/2022 [facility]     DOXAZOSIN MESYLATE 4 MG TAB   TAKE (1) TABLET BY MOUTH DAILY.(Indications for Use: HTN)(Indications for use: HTN)  Pharmacy Active 5/23/2022 08:00  5/18/2022  There is a potential drug interaction with another medication. Please click to view details. Dose Warning SODIUM BICARB 650 MG TABLET   TAKE (1) TABLET BY MOUTH DAILY.(Indications for Use: HYPONATREMIA)(Indications for use: HYPONATREMIA)  Pharmacy Active 5/23/2022 08:00  5/18/2022  There is a potential drug interaction with another medication. Please click to view details. CYCLOBENZAPRINE 5 MG TABLET   TAKE (1) TABLET BY MOUTH AT BEDTIME.(Related Diagnoses: PAIN, UNSPECIFIED (R52))(Indications for Use: PAIN)(Indications for use: PAIN)  Pharmacy Active 5/23/2022 20:00  5/18/2022  PANTOPRAZOLE SOD DR 40 MG T   TAKE (1) TABLET BY MOUTH DAILY BEFORE BREAKFAST.(Indications for Use: GERD)(Indications for use: GERD)  Pharmacy Active 5/23/2022 08:00  5/18/2022  There is a black box warning associated with this order. Please click to view details. There is a potential drug  interaction with another medication. Please click to view details. LOSARTAN POTASSIUM 50 MG TA   TAKE (1) TABLET BY MOUTH TWICE DAILY.(Indications for Use: HTN)(Indications for use: HTN)  Pharmacy Active 5/23/2022 08:00  5/18/2022  There is a black box warning associated with this order. Please click to view details. LOPERAMIDE 2 MG CAPSULE   TAKE 1 CAPSULE BY MOUTH ONCE DAILY AS NEEDED FOR DIARRHEA(Indications for use: DIARRHEA)  Pharmacy Active 5/23/2022 07:00  5/18/2022  ALBUTEROL SUL HFA 90 MCG IN   INHALE 2 PUFFS BY MOUTH EVERY 6 HOURS AS NEEDED(Indications for Use: WHEEZING/SOB)(Indications for use: WHEEZING/SOB)  Pharmacy Active 5/23/2022 07:00  5/18/2022  There is a potential drug interaction with another medication. Please click to view details. CLONIDINE HCL 0.3 MG TABLET   TAKE (1) TABLET BY MOUTH TWICE DAILY.(Indications for Use: HTN)(Indications for use: HTN)  Pharmacy Active 7/8/2022 08:00  7/5/2022  TORSEMIDE 20 MG TABLET   TAKE (2) TABLETS BY MOUTH DAILY.(Indications for Use: edema)(Indications for use: edema)  Pharmacy Active 8/23/2022 08:00  8/22/2022  There is a black box warning associated with this order. Please click to view details. There is a potential drug interaction with another medication. Please click to view details. ESCITALOPRAM 20 MG TABLET   TAKE (1) TABLET BY MOUTH AT BEDTIME.(Related Diagnoses: ANXIETY DISORDER, UNSPECIFIED (F41.9))  Pharmacy Active 11/8/2022 20:00  11/8/2022  ACETAMINOPHEN 325 MG TABLET   TAKE (2) TABLETS BY MOUTH EVERY (8) HOURS AS NEEDED(MAX OF 3000MG/APAP/24 HOURS FROM ALL SOURCES)(Indications for Use: Pain/Fever)  Pharmacy Active 12/2/2022 10:03  12/4/2022  Dose Check could not be performed. NYSTATIN POWDER   APPLY TOPICALLY TO AFFECTED AREAS OF GROIN 2 TIMES A DAY AS NEEDED(Indications for Use: skin intergity)  Pharmacy Active 2/15/2023 11:41  2/15/2023  There is a potential drug interaction with another medication. Please click to view details. Dose  Warning HYDRALAZINE 100 MG TABLET   Give 1 tablet orally every morning and at bedtime related to ESSENTIAL (PRIMARY) HYPERTENSION (I10)(Additional Directions: *HOLD IF SBP  OR LESS*) AND Give 1 tablet orally in the afternoon related to ESSENTIAL (PRIMARY) HYPERTENSION (I10)(Additional Directions: *HOLD IF SBP  OR LESS*)  Pharmacy Active 4/10/2023 20:00  4/22/2023  BIOTENE MOUTHWASH   RINSE MOUTH WITH 30 ML TWICE DAILY AS DIRECTED, SPIT OUT AFTER USE.(Indications for Use: DRY MOUTH)(Indications for use: DRY MOUTH)  Pharmacy Active 4/10/2023 20:00  4/10/2023  Warning: Controlled Drug Dose Check could not be performed. TRAMADOL HCL 50 MG TABLET (Tramadol HCl)   TAKE (1) TABLET BY MOUTH TWICE DAILY.(Indications for use: pain)(Additional Directions: PLEASE FAX DR)  Pharmacy Active 10/5/2023 16:22  11/15/2023  Dose Check could not be performed. METOPROLOL SUCC ER 50 MG TA (Metoprolol Succinate)   TAKE (1) TABLET BY MOUTH TWICE DAILY, HOLD FOR HR <60(Indications for use: HTN)  Pharmacy Active 10/6/2023 02:37  10/6/2023  Dose Check could not be performed. SULFAMETHOXAZOLE-TMP DS TAB (BACTRIM DS TABLET) (Sulfamethoxazole-Trimethoprim)   TAKE (1) TABLET BY MOUTH THREE TIMES WEEKLY(Indications for use: prevention)  Pharmacy Active 10/21/2023 14:48  10/21/2023  BUSPIRONE HCL 5 MG TABLET (BUSPAR 5 MG TABLET) (Buspirone HCl)   TAKE (1) TABLET BY MOUTH TWICE DAILY.  Pharmacy Active 11/22/2023 11:57  11/22/2023  MELATONIN 3 MG TABLET   TAKE (2) TABLETS BY MOUTH AT BEDTIME.(Indications for use: insomnia) AND  Pharmacy Active 11/29/2023 15:26  11/29/2023  HYDROXYZINE BETH 25 MG CAP (VISTARIL 25 MG CAPSULE) (Hydroxyzine Pamoate)   TAKE 1 CAPSULE BY MOUTH EVERY 8 HOURS AS NEEDED(Indications for use: ANXIETY & AGITATION )  Pharmacy Active 11/29/2023 15:27  11/29/2023  MIRTAZAPINE 15 MG TABLET (Mirtazapine)   TAKE (1) TABLET BY MOUTH AT BEDTIME.  Pharmacy Active 12/1/2023 20:00  12/1/2023    Rx list reviewed.   PT and OT  evaluation is in the process.   Routine safety measures, fall precautions, risk modification and alarm placement if needed for prevention of falls.   Skin care precautions, prevention of pressures sores at pressure points assessed.   Pt needs to be monitored frequently by nursing staff particularly at night time.   Any confusion, agitation or behavioural disturbance needs to be attended, as per home policy   rapid covid Ag assay need to be done, notify if positive.   If needed appropriate measures to be taken for alarm placements and assisted devices, pt was told not to get up and ambulate at night unless help and assist available at bedside,   labs will be done as per our routine protocol.   PO intake need to be monitored if consuming po.       Charting is done using voice recognition software and may contain errors which have not been completely corrected          History of Present Illness  Assisted living note     Place of service- [facility] assisted living facility in [facility]        Patient is unable to give any detailed history and therefore history is obtained from the chart     No acute issues reported by patient or concerns raised by nursing     Past history  Anxiety and depression, hypertension, vascular dementia, GERD, coronary artery disease, DJD, CKD 3, cataracts, chronic back pain        Allergies-latex rubber     Social history-, negative for alcohol tobacco or drugs     Family history- noncontributory      Review of Systems  All eleven systems were reviewed with the patient and were negative for any symptoms other than what is documented in the history of present illness.        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 Lungs are clear    Cardiovascular: heart rate normal. No gallop  Back - normal inspection  Skin inspection:Warm  Musculoskeletal : No deformities  Neuro : Limited exam. Baseline  Psychiatric : Cooperative